# Patient Record
Sex: MALE | Race: BLACK OR AFRICAN AMERICAN | Employment: UNEMPLOYED | URBAN - METROPOLITAN AREA
[De-identification: names, ages, dates, MRNs, and addresses within clinical notes are randomized per-mention and may not be internally consistent; named-entity substitution may affect disease eponyms.]

---

## 2017-10-02 ENCOUNTER — APPOINTMENT (OUTPATIENT)
Dept: ULTRASOUND IMAGING | Age: 52
End: 2017-10-02
Attending: NURSE PRACTITIONER
Payer: SELF-PAY

## 2017-10-02 ENCOUNTER — HOSPITAL ENCOUNTER (EMERGENCY)
Age: 52
Discharge: HOME | End: 2017-10-02
Attending: EMERGENCY MEDICINE
Payer: SELF-PAY

## 2017-10-02 VITALS
HEIGHT: 71 IN | RESPIRATION RATE: 26 BRPM | BODY MASS INDEX: 24.5 KG/M2 | WEIGHT: 175 LBS | OXYGEN SATURATION: 99 % | TEMPERATURE: 98.5 F | SYSTOLIC BLOOD PRESSURE: 161 MMHG | HEART RATE: 88 BPM | DIASTOLIC BLOOD PRESSURE: 69 MMHG

## 2017-10-02 DIAGNOSIS — M79.604 LEG PAIN, DIFFUSE, RIGHT: Primary | ICD-10-CM

## 2017-10-02 LAB
ALBUMIN SERPL-MCNC: 3.4 G/DL (ref 3.5–5)
ALBUMIN/GLOB SERPL: 0.7 {RATIO} (ref 1.2–3.5)
ALP SERPL-CCNC: 151 U/L (ref 50–136)
ALT SERPL-CCNC: 56 U/L (ref 12–65)
ANION GAP SERPL CALC-SCNC: 7 MMOL/L (ref 7–16)
AST SERPL-CCNC: 39 U/L (ref 15–37)
BASOPHILS # BLD: 0 K/UL (ref 0–0.2)
BASOPHILS NFR BLD: 0 % (ref 0–2)
BILIRUB SERPL-MCNC: 0.2 MG/DL (ref 0.2–1.1)
BUN SERPL-MCNC: 13 MG/DL (ref 6–23)
CALCIUM SERPL-MCNC: 8.8 MG/DL (ref 8.3–10.4)
CHLORIDE SERPL-SCNC: 107 MMOL/L (ref 98–107)
CO2 SERPL-SCNC: 27 MMOL/L (ref 21–32)
CREAT SERPL-MCNC: 1.57 MG/DL (ref 0.8–1.5)
DIFFERENTIAL METHOD BLD: ABNORMAL
EOSINOPHIL # BLD: 0.1 K/UL (ref 0–0.8)
EOSINOPHIL NFR BLD: 2 % (ref 0.5–7.8)
ERYTHROCYTE [DISTWIDTH] IN BLOOD BY AUTOMATED COUNT: 12.8 % (ref 11.9–14.6)
GLOBULIN SER CALC-MCNC: 5.2 G/DL (ref 2.3–3.5)
GLUCOSE SERPL-MCNC: 117 MG/DL (ref 65–100)
HCT VFR BLD AUTO: 40.7 % (ref 41.1–50.3)
HGB BLD-MCNC: 13.9 G/DL (ref 13.6–17.2)
IMM GRANULOCYTES # BLD: 0 K/UL (ref 0–0.5)
IMM GRANULOCYTES NFR BLD: 0.4 % (ref 0–5)
LYMPHOCYTES # BLD: 2.1 K/UL (ref 0.5–4.6)
LYMPHOCYTES NFR BLD: 38 % (ref 13–44)
MCH RBC QN AUTO: 31.4 PG (ref 26.1–32.9)
MCHC RBC AUTO-ENTMCNC: 34.2 G/DL (ref 31.4–35)
MCV RBC AUTO: 91.9 FL (ref 79.6–97.8)
MONOCYTES # BLD: 0.3 K/UL (ref 0.1–1.3)
MONOCYTES NFR BLD: 5 % (ref 4–12)
NEUTS SEG # BLD: 2.9 K/UL (ref 1.7–8.2)
NEUTS SEG NFR BLD: 55 % (ref 43–78)
PLATELET # BLD AUTO: 231 K/UL (ref 150–450)
PMV BLD AUTO: 10 FL (ref 10.8–14.1)
POTASSIUM SERPL-SCNC: 3.8 MMOL/L (ref 3.5–5.1)
PROT SERPL-MCNC: 8.6 G/DL (ref 6.3–8.2)
RBC # BLD AUTO: 4.43 M/UL (ref 4.23–5.67)
SODIUM SERPL-SCNC: 141 MMOL/L (ref 136–145)
WBC # BLD AUTO: 5.5 K/UL (ref 4.3–11.1)

## 2017-10-02 PROCEDURE — 99283 EMERGENCY DEPT VISIT LOW MDM: CPT | Performed by: EMERGENCY MEDICINE

## 2017-10-02 PROCEDURE — 85025 COMPLETE CBC W/AUTO DIFF WBC: CPT | Performed by: EMERGENCY MEDICINE

## 2017-10-02 PROCEDURE — 74011250637 HC RX REV CODE- 250/637: Performed by: EMERGENCY MEDICINE

## 2017-10-02 PROCEDURE — 80053 COMPREHEN METABOLIC PANEL: CPT | Performed by: EMERGENCY MEDICINE

## 2017-10-02 PROCEDURE — 93971 EXTREMITY STUDY: CPT

## 2017-10-02 RX ORDER — DOXYCYCLINE 100 MG/1
100 CAPSULE ORAL
Status: COMPLETED | OUTPATIENT
Start: 2017-10-02 | End: 2017-10-02

## 2017-10-02 RX ORDER — DOXYCYCLINE HYCLATE 100 MG
100 TABLET ORAL 2 TIMES DAILY
Qty: 20 TAB | Refills: 0 | Status: SHIPPED | OUTPATIENT
Start: 2017-10-02 | End: 2017-10-12

## 2017-10-02 RX ADMIN — DOXYCYCLINE HYCLATE 100 MG: 100 CAPSULE ORAL at 18:27

## 2017-10-02 NOTE — ED TRIAGE NOTES
Pt states pain to his right lower leg and foot for the past two days. Pt has swelling to the lower right leg and foot. Pt denies any tenderness to touch. Pt is able to walk to triage with no deficits in gate or stance.

## 2017-10-02 NOTE — ED PROVIDER NOTES
Patient is a 54 y.o. male presenting with leg pain. The history is provided by the patient. Leg Pain    This is a new problem. The current episode started 2 days ago. The problem occurs constantly. The problem has not changed since onset. The pain is present in the right lower leg. The quality of the pain is described as aching. The pain is at a severity of 3/10. The pain is mild. Pertinent negatives include no numbness and full range of motion. He has tried nothing for the symptoms. There has been no history of extremity trauma. Past Medical History:   Diagnosis Date    GERD (gastroesophageal reflux disease)     Seasonal allergic rhinitis        History reviewed. No pertinent surgical history. Family History:   Problem Relation Age of Onset    Diabetes Father     Hypertension Father     Diabetes Paternal Grandmother        Social History     Social History    Marital status: SINGLE     Spouse name: N/A    Number of children: N/A    Years of education: N/A     Occupational History    Not on file. Social History Main Topics    Smoking status: Never Smoker    Smokeless tobacco: Never Used    Alcohol use Yes      Comment: occasional beer    Drug use: No    Sexual activity: Yes     Partners: Female     Birth control/ protection: Condom     Other Topics Concern    Not on file     Social History Narrative         ALLERGIES: Review of patient's allergies indicates no known allergies. Review of Systems   Constitutional: Negative. HENT: Negative. Respiratory: Negative. Cardiovascular: Negative. Gastrointestinal: Negative. Endocrine: Negative. Genitourinary: Negative. Musculoskeletal: Negative. Skin: Negative. Neurological: Negative. Negative for numbness. Hematological: Negative.         Vitals:    10/02/17 1507   BP: 172/79   Pulse: 95   Resp: 26   Temp: 98.3 °F (36.8 °C)   SpO2: 99%   Weight: 79.4 kg (175 lb)   Height: 5' 11\" (1.803 m)            Physical Exam Constitutional: He is oriented to person, place, and time. He appears well-developed and well-nourished. Non-toxic appearance. He does not have a sickly appearance. He does not appear ill. No distress. HENT:   Head: Normocephalic and atraumatic. Cardiovascular: Intact distal pulses. Pulmonary/Chest: Effort normal.   Abdominal: Soft. Neurological: He is alert and oriented to person, place, and time. Skin: Skin is warm and dry. Erythematous, warm to touch and slightly tender diffusely. Relative alopecia on both LE; palpable DP pulses. Psychiatric: He has a normal mood and affect. His behavior is normal.   Nursing note and vitals reviewed. MDM  Number of Diagnoses or Management Options  Leg pain, diffuse, right: new and requires workup  Diagnosis management comments: No DVT on US. Will start on empiric antibiotic for possible cellulitis and have follow up with PCP. Return to ER for fever, worsening symptoms, or other concerns in the interval.       Amount and/or Complexity of Data Reviewed  Tests in the radiology section of CPT®: ordered and reviewed (Duplex Lower Ext Venous Right    Result Date: 10/2/2017  Right lower extremity venous ultrasound INDICATION:  Pain and swelling, mild to moderate, acute COMPARISON: 11/10/2016 Doppler ultrasound of the right lower extremity was performed. FINDINGS: There is no evidence of Baker's cyst. There is normal flow in the common femoral, superficial femoral, greater saphenous, femoral and popliteal veins. Normal compression and augmentation demonstrated. The proximal calf veins are also patent as is the partially visualized contralateral common femoral vein.      IMPRESSION: No evidence of deep venous thrombosis in the right lower extremity   )      ED Course       Procedures

## 2017-10-02 NOTE — PROGRESS NOTES
Visit with patient in ER. Calm.     Jaime Hodge, staff   C: [phone] /  [email or web address]

## 2017-10-02 NOTE — ED TRIAGE NOTES
Joseluis Perez Sailor is a 54 y.o. male here for right leg pain. Rapid assessment performed. --- Orders were placed. --- Patient will be waiting room  Signed By: Savanna Fitch NP     October 2, 2017          Right lower ext pain and swelling and erythema onset two days ago. No injury.   hsx cellulitis las year same extremity,  Start diagnostics in triage